# Patient Record
Sex: FEMALE | Race: WHITE | NOT HISPANIC OR LATINO | URBAN - METROPOLITAN AREA
[De-identification: names, ages, dates, MRNs, and addresses within clinical notes are randomized per-mention and may not be internally consistent; named-entity substitution may affect disease eponyms.]

---

## 2022-10-13 ENCOUNTER — APPOINTMENT (RX ONLY)
Dept: URBAN - METROPOLITAN AREA CLINIC 328 | Facility: CLINIC | Age: 67
Setting detail: DERMATOLOGY
End: 2022-10-13

## 2022-10-13 DIAGNOSIS — L81.4 OTHER MELANIN HYPERPIGMENTATION: ICD-10-CM

## 2022-10-13 DIAGNOSIS — L98.8 OTHER SPECIFIED DISORDERS OF THE SKIN AND SUBCUTANEOUS TISSUE: ICD-10-CM

## 2022-10-13 DIAGNOSIS — L57.8 OTHER SKIN CHANGES DUE TO CHRONIC EXPOSURE TO NONIONIZING RADIATION: ICD-10-CM

## 2022-10-13 PROCEDURE — ? IN-HOUSE DISPENSING PHARMACY

## 2022-10-13 PROCEDURE — ? RECOMMENDATIONS

## 2022-10-13 PROCEDURE — 99203 OFFICE O/P NEW LOW 30 MIN: CPT

## 2022-10-13 PROCEDURE — ? COUNSELING

## 2022-10-13 PROCEDURE — ? ADDITIONAL NOTES

## 2022-10-13 ASSESSMENT — LOCATION DETAILED DESCRIPTION DERM
LOCATION DETAILED: LEFT INFERIOR CENTRAL MALAR CHEEK
LOCATION DETAILED: LEFT INFERIOR LATERAL MALAR CHEEK
LOCATION DETAILED: RIGHT INFERIOR MEDIAL MALAR CHEEK
LOCATION DETAILED: LEFT MEDIAL MALAR CHEEK
LOCATION DETAILED: RIGHT CENTRAL MALAR CHEEK

## 2022-10-13 ASSESSMENT — LOCATION SIMPLE DESCRIPTION DERM
LOCATION SIMPLE: RIGHT CHEEK
LOCATION SIMPLE: LEFT CHEEK

## 2022-10-13 ASSESSMENT — LOCATION ZONE DERM: LOCATION ZONE: FACE

## 2022-10-13 NOTE — PROCEDURE: MIPS QUALITY
Called patient to schedule a screening mammogram   
Quality 111:Pneumonia Vaccination Status For Older Adults: Pneumococcal vaccine (PPSV23) was not administered on or after patient’s 60th birthday and before the end of the measurement period, reason not otherwise specified
Quality 130: Documentation Of Current Medications In The Medical Record: Current Medications Documented
Detail Level: Detailed
Quality 431: Preventive Care And Screening: Unhealthy Alcohol Use - Screening: Patient not identified as an unhealthy alcohol user when screened for unhealthy alcohol use using a systematic screening method
Quality 226: Preventive Care And Screening: Tobacco Use: Screening And Cessation Intervention: Patient screened for tobacco use and is an ex/non-smoker

## 2022-10-13 NOTE — PROCEDURE: IN-HOUSE DISPENSING PHARMACY
Product 49 Refills: 0
Product 21 Application Directions: Take one tablet twice daily with food.
Product 19 Price/Unit (In Dollars): 55
Product 74 Unit Type: mg
Product 17 Amount/Unit (Numbers Only): 1
Product 10 Unit Type: bottle(s)
Name Of Product 11: #11 Antibacterial Ointment
Product 15 Application Directions: Apply to the scalp 2-3 times weekly. Can be applied every other shampoo as well.
Product 21 Unit Type: capsules
Product 13 Price/Unit (In Dollars): 45
Name Of Product 2: #2 Acne Moisturizing Cream
Product 19 Amount/Unit (Numbers Only): 60
Product 24 Price/Unit (In Dollars): 10
Product 6 Application Directions: Apply to the scalp three times weekly. (Suggested Monday, Wednesday, Friday)
Name Of Product 22: #22 Prednisone 20mg
Name Of Product 7: #7 Anti Fungal Nail Solution
Product 11 Application Directions: Apply twice daily to wound/affected areas.
Product 2 Application Directions: Apply to the face at bedtime. Start every other night hen increase usage depending on tolerance.
Name Of Product 16: #16 Dermatitis Topical Solution
Product 22 Application Directions: Use as directed by provider.
Product 20 Price/Unit (In Dollars): 15
Name Of Product 5: #5 Actinic Keratosis (Actinic Gel)
Product 22 Amount/Unit (Numbers Only): 18
Product 9 Application Directions: Apply to the affected area three times daily.
Product 20 Application Directions: Take one tablet daily.
Product 3 Price/Unit (In Dollars): 40
Product 14 Application Directions: Apply to the face once daily (AM or PM).
Name Of Product 10: #10 Fungal Dermatitis Cream
Product 20 Unit Type: tablets
Product 18 Amount/Unit (Numbers Only): 20
Product 12 Refills: 3
Product 5 Application Directions: For Warts/MC- apply to the area 2-3 times weekly.\\nFor (Pre)Skin Cancer- apply nightly for 2-6 weeks or as directed by physician.
Name Of Product 21: #21 Minocycline
Name Of Product 15: #15 Antifungal Shampoo
Product 8 Price/Unit (In Dollars): 35
Name Of Product 6: #6 Alopecia Topical Solution/Gel
Product 23 Amount/Unit (Numbers Only): 5
Product 10 Application Directions: Apply to the affected area twice daily for 10 days.
Name Of Product 13: #13 Rosacea Cream
Name Of Product 1: #1 Acne Gel
Product 17 Application Directions: Apply daily to affected areas.
Product 15 Price/Unit (In Dollars): 30
Name Of Product 4: #4 Acne Gel
Product 8 Application Directions: Apply to the affected area twice daily.
Product 19 Application Directions: Take one tablet twice daily with food or as directed by provider.
Name Of Product 24: Spironolactone 25mg
Product 1 Application Directions: Apply to the face twice daily (if another rx is being prescribed it will only be applied in the AM)
Product 13 Application Directions: Apply to the face thin layer daily.
Name Of Product 9: #9 Dermatitis Cream
Product 2 Price/Unit (In Dollars): 50
Product 24 Application Directions: Take one tablet twice daily or as directed by provider.
Product 4 Application Directions: Apply to face at bedtime.
Name Of Product 20: #20 Loratadine
Name Of Product 14: #14 Rosacea Silicone Gel
Name Of Product 18: #18 Bactrim
Name Of Product 12: #12 Melasma Emulsion
Detail Level: Zone
Product 16 Application Directions: Apply to the affected area 1-2 times daily depending on diagnosis.
Name Of Product 3: #3 Acne Gel Combo
Product 7 Application Directions: Apply to the nails daily. (Please do not apply socks for about 5 hours, best if applied at night) Remove once weekly then start again.
Name Of Product 23: #23 Prednisone 50mg
Send Charges To Patient Encounter: Yes
Render Product Pricing In Note: No
Product 12 Application Directions: Apply to the face three times weekly at night for two months only. Take a two month break and then continue for two months again. Can be applied with Lytera daily.
Product 3 Application Directions: Apply to the face once daily AM or PM depending if Retinol or Adapalene given for bedtime.
Name Of Product 8: #8 Anti Fungal Cream
Name Of Product 17: #17 Xerosis Gel
Name Of Product 19: #19 Doxycycline

## 2022-10-13 NOTE — PROCEDURE: ADDITIONAL NOTES
Additional Notes: Voluma 4 vials and 1 vial of Ultra Plus
Render Risk Assessment In Note?: no
Detail Level: Simple

## 2022-10-13 NOTE — PROCEDURE: RECOMMENDATIONS
Render Risk Assessment In Note?: no
Recommendation Preamble: The following recommendations were made during the visit: SPF powder brush, chemical peels
Detail Level: Zone